# Patient Record
Sex: MALE | HISPANIC OR LATINO | Employment: FULL TIME | URBAN - METROPOLITAN AREA
[De-identification: names, ages, dates, MRNs, and addresses within clinical notes are randomized per-mention and may not be internally consistent; named-entity substitution may affect disease eponyms.]

---

## 2024-09-26 ENCOUNTER — HOSPITAL ENCOUNTER (EMERGENCY)
Facility: CLINIC | Age: 44
Discharge: HOME | End: 2024-09-26
Attending: EMERGENCY MEDICINE | Admitting: EMERGENCY MEDICINE

## 2024-09-26 VITALS
HEART RATE: 86 BPM | DIASTOLIC BLOOD PRESSURE: 102 MMHG | OXYGEN SATURATION: 97 % | SYSTOLIC BLOOD PRESSURE: 164 MMHG | RESPIRATION RATE: 18 BRPM | TEMPERATURE: 97.6 F

## 2024-09-26 DIAGNOSIS — H66.92 ACUTE LEFT OTITIS MEDIA: ICD-10-CM

## 2024-09-26 PROCEDURE — 99283 EMERGENCY DEPT VISIT LOW MDM: CPT

## 2024-09-26 ASSESSMENT — COLUMBIA-SUICIDE SEVERITY RATING SCALE - C-SSRS
6. HAVE YOU EVER DONE ANYTHING, STARTED TO DO ANYTHING, OR PREPARED TO DO ANYTHING TO END YOUR LIFE?: NO
1. IN THE PAST MONTH, HAVE YOU WISHED YOU WERE DEAD OR WISHED YOU COULD GO TO SLEEP AND NOT WAKE UP?: NO
2. HAVE YOU ACTUALLY HAD ANY THOUGHTS OF KILLING YOURSELF IN THE PAST MONTH?: NO

## 2024-09-26 ASSESSMENT — ACTIVITIES OF DAILY LIVING (ADL): ADLS_ACUITY_SCORE: 33

## 2024-09-26 NOTE — ED TRIAGE NOTES
Right ear pain for three days. Denies cough or fevers. Took ibuprofen at home that helped somewhat.      Triage Assessment (Adult)       Row Name 09/26/24 0054          Triage Assessment    Airway WDL WDL        Respiratory WDL    Respiratory WDL WDL        Skin Circulation/Temperature WDL    Skin Circulation/Temperature WDL WDL        Cardiac WDL    Cardiac WDL WDL

## 2024-09-26 NOTE — ED PROVIDER NOTES
Emergency Department Note      History of Present Illness     Chief Complaint  Otalgia    HPI  File Jone is a 44 year old male with history of hypertension who presents to the ED for evaluation of otalgia. He reports right ear pain for the past four days. Denies fever, runny nose, or sore throat. No recent use of antibiotics.     Independent Historian  None    Review of External Notes  None  Past Medical History   Medical History and Problem List  Hypertension    Medications  Lisinopril  Physical Exam   Patient Vitals for the past 24 hrs:   BP Temp Pulse Resp SpO2   09/26/24 0054 (!) 164/102 97.6  F (36.4  C) 86 18 97 %     Physical Exam  Gen: Resting comfortably   Eyes: Clear conjunctiva, no discharge  Ears: External ears normal without swelling or drainage. Left TM clear. Right TM bulging and arrhythmias   Mouth: Mucous membranes moist  CV: regular rate  Resp: speaking in full sentences without any resp distress  Skin: warm dry well perfused  Neuro: Alert, no gross motor or sensory deficits,   Psych: pleasant, affect appropriate  Diagnostics     ED Course    Medications Administered  Medications - No data to display    Procedures  Procedures     Discussion of Management  None    Optional/Additional Documentation  None    ED Course  ED Course as of 09/26/24 0515   Thu Sep 26, 2024   0139 I obtained history and examined the patient as noted above.    0142 I prepared the patient to be discharged home.      Medical Decision Making / Diagnosis   CMS Diagnoses: None    MIPS     None    MDM  Patient presents with ear pain, exam is consistent with otitis media.  Will place on antibiotics, discussed over-the-counter pain meds as needed.    Disposition  The patient was discharged.     ICD-10 Codes:    ICD-10-CM    1. Acute left otitis media  H66.92          Discharge Medications  Discharge Medication List as of 9/26/2024  1:43 AM        START taking these medications    Details   amoxicillin-clavulanate (AUGMENTIN)  875-125 MG tablet Take 1 tablet by mouth 2 times daily for 10 days., Disp-20 tablet, R-0, E-Prescribe           Scribe Disclosure:  I, Cecy Kennedy, am serving as a scribe at 1:43 AM on 9/26/2024 to document services personally performed by Myron Nunes MD based on my observations and the provider's statements to me.      Myron Nunes MD  09/26/24 0584

## 2025-01-18 ENCOUNTER — HOSPITAL ENCOUNTER (EMERGENCY)
Facility: CLINIC | Age: 45
Discharge: HOME | End: 2025-01-18
Attending: STUDENT IN AN ORGANIZED HEALTH CARE EDUCATION/TRAINING PROGRAM | Admitting: STUDENT IN AN ORGANIZED HEALTH CARE EDUCATION/TRAINING PROGRAM

## 2025-01-18 VITALS
RESPIRATION RATE: 16 BRPM | SYSTOLIC BLOOD PRESSURE: 136 MMHG | HEART RATE: 75 BPM | OXYGEN SATURATION: 99 % | TEMPERATURE: 97.3 F | DIASTOLIC BLOOD PRESSURE: 89 MMHG

## 2025-01-18 DIAGNOSIS — L40.9 PSORIASIS: ICD-10-CM

## 2025-01-18 PROCEDURE — 99283 EMERGENCY DEPT VISIT LOW MDM: CPT

## 2025-01-18 ASSESSMENT — COLUMBIA-SUICIDE SEVERITY RATING SCALE - C-SSRS
1. IN THE PAST MONTH, HAVE YOU WISHED YOU WERE DEAD OR WISHED YOU COULD GO TO SLEEP AND NOT WAKE UP?: NO
2. HAVE YOU ACTUALLY HAD ANY THOUGHTS OF KILLING YOURSELF IN THE PAST MONTH?: NO
6. HAVE YOU EVER DONE ANYTHING, STARTED TO DO ANYTHING, OR PREPARED TO DO ANYTHING TO END YOUR LIFE?: NO

## 2025-01-18 NOTE — ED TRIAGE NOTES
Pt states rash on arm, leg and head for over a year. Rash appears ry an scaly. States itchy.      Triage Assessment (Adult)       Row Name 01/18/25 1128          Triage Assessment    Airway WDL WDL        Respiratory WDL    Respiratory WDL WDL        Skin Circulation/Temperature WDL    Skin Circulation/Temperature WDL --  scaly dry skin patches        Cardiac WDL    Cardiac WDL WDL        Peripheral/Neurovascular WDL    Peripheral Neurovascular WDL WDL        Cognitive/Neuro/Behavioral WDL    Cognitive/Neuro/Behavioral WDL WDL

## 2025-01-18 NOTE — DISCHARGE INSTRUCTIONS
Return to the emergency department if symptoms are worsening, become concerning, or for any other concerns.     You will be contacted by dermatology for follow-up.

## 2025-01-18 NOTE — ED PROVIDER NOTES
Emergency Department Note      History of Present Illness     Chief Complaint   Rash      [facility]   File Jone is a 44 year old male with a history of hypertension presenting to the ED for a rash. Patient reports a scaly, itchy rash localized on his scalp, left forearm, and right knee that started around 18 months ago. He denies any changes to detergents or soap, fever, or problems drinking and eating. Applying Barmicil relieves the symptoms for a few days, with the last application being 20 days ago.     Independent Historian   None    Review of External Notes   None    Past Medical History     Medical History and Problem List   Hypertension    Medications   Prinivil    Surgical History   No past surgical history on file.    Physical Exam     Patient Vitals for the past 24 hrs:   BP Temp Temp src Pulse Resp SpO2   01/18/25 1129 136/89 97.3  F (36.3  C) Temporal 75 16 99 %     Physical Exam  GENERAL: Patient well-appearing  HEAD: Atraumatic.  NECK: No rigidity  CV: RRR, no murmurs, rubs or gallops  PULM: CTAB with good aeration; no retractions, rales, rhonchi, or wheezing  DERM: Well-demarcated, scaly plaques over extremities and predominantly scalp.  Not erythematous.  No pus.  No foul smell.  EXTREMITY: Moving all extremities without difficulty.        Diagnostics     Lab Results   Labs Ordered and Resulted from Time of ED Arrival to Time of ED Departure - No data to display    Imaging   No orders to display       EKG   None    Independent Interpretation   None    ED Course      Medications Administered   Medications - No data to display    Procedures   Procedures     Discussion of Management   None    ED Course   ED Course as of 01/18/25 1223   Sat Jan 18, 2025   1139 I obtained history and examined the patient as noted above.         Additional Documentation  None    Medical Decision Making / Diagnosis     CMS Diagnoses: None    MIPS       None    [facility]   File Jone is a 44 year old male presenting with rash  with symptoms most consistent with psoriasis.  Patient has a topical steroid, betamethasone that provides relief.  I recommend he keep his skin very well moisturized.  I discussed dietary modifications.  I placed a referral for dermatology. No evidence of cellulitis.  Do not think he requires antibiotics.  Patient was evaluated for acute medical emergencies. Based on my clinical assessment, I do not think any further acute management or work-up is required.  Patient stable for discharge. Given strict return precautions. All questions answered. Patient content with plan. Recommended PCP follow-up in 2-3 days.    Disposition   The patient was discharged.     Diagnosis     ICD-10-CM    1. Psoriasis  L40.9 Adult Dermatology  Referral           Discharge Medications   Discharge Medication List as of 1/18/2025 11:50 AM            Scribe Disclosure:  I, Thom Marroquin, am serving as a scribe at 11:50 AM on 1/18/2025 to document services personally performed by Luis Donahue MD based on my observations and the provider's statements to me.        Luis Donahue MD  01/18/25 2885

## 2025-01-20 ENCOUNTER — TELEPHONE (OUTPATIENT)
Dept: DERMATOLOGY | Facility: CLINIC | Age: 45
End: 2025-01-20

## 2025-01-20 NOTE — TELEPHONE ENCOUNTER
[facility] Call Center    Phone Message    May a detailed message be left on voicemail: yes     Reason for Call: Appointment Intake    Referring Provider Name: Luis Donahue MD @ [facility] Emergency Dept  Diagnosis and/or Symptoms: Psoriasis [L40.9]    Confirmed with patient that this is a hospital follow up and he prefers the [place] location. Routing to clinic per protocols    Action Taken: Message routed to:  Adult Clinics: Dermatology p [id number]    Travel Screening: Not Applicable     Date of Service:

## 2025-01-20 NOTE — TELEPHONE ENCOUNTER
RN reviewed referral, non urgent. Can schedule in next available.    consistent with psoriasis.  Patient has a topical steroid, betamethasone that provides relief.  I recommend he keep his skin very well moisturized.  I discussed dietary modifications.  I placed a referral for dermatology. No evidence of cellulitis.  Do not think he requires antibiotics.  Patient was evaluated for acute medical emergencies. Based on my clinical assessment, I do not think any further acute management or work-up is required.  Patient stable for discharge.    Tracy Pan RN on 1/20/2025 at 12:36 PM

## 2025-01-20 NOTE — TELEPHONE ENCOUNTER
01/20 Called patient (1st attempt) unable to leave voicemail to provided [phone] for patient to call back and schedule next available dermatology appointment.     [facility]     Surgical Specialties   [facility] [place]

## 2025-01-29 NOTE — TELEPHONE ENCOUNTER
FUTURE VISIT INFORMATION      FUTURE VISIT INFORMATION:  Date: 2.18.25  Time: 8:00  Location[place] [facility] [place]  REFERRAL INFORMATION:  Referring provider:  Godfrey  Referring providers clinic:  [facility]  Reason for visit/diagnosis  Per Pt File, L40.9 (ICD-10-CM) - Psoriasis, referred by ANI ACEVEDO      RECORDS REQUESTED FROM:       Clinic name Comments Records Status Imaging Status   [facility] 1.18.25  God[facility]

## 2025-02-18 ENCOUNTER — PRE VISIT (OUTPATIENT)
Dept: DERMATOLOGY | Facility: CLINIC | Age: 45
End: 2025-02-18

## 2025-04-01 ENCOUNTER — TELEPHONE (OUTPATIENT)
Dept: DERMATOLOGY | Facility: CLINIC | Age: 45
End: 2025-04-01

## 2025-04-01 ENCOUNTER — OFFICE VISIT (OUTPATIENT)
Dept: DERMATOLOGY | Facility: CLINIC | Age: 45
End: 2025-04-01
Payer: MEDICAID

## 2025-04-01 ENCOUNTER — LAB (OUTPATIENT)
Dept: LAB | Facility: CLINIC | Age: 45
End: 2025-04-01
Payer: MEDICAID

## 2025-04-01 DIAGNOSIS — L40.9 PSORIASIS: ICD-10-CM

## 2025-04-01 DIAGNOSIS — Z51.81 THERAPEUTIC DRUG MONITORING: Primary | ICD-10-CM

## 2025-04-01 DIAGNOSIS — Z51.81 THERAPEUTIC DRUG MONITORING: ICD-10-CM

## 2025-04-01 DIAGNOSIS — Z11.59 ENCOUNTER FOR SCREENING FOR OTHER VIRAL DISEASES: ICD-10-CM

## 2025-04-01 LAB
ALBUMIN SERPL BCG-MCNC: 4.6 G/DL (ref 3.5–5.2)
ALP SERPL-CCNC: 84 U/L (ref 40–150)
ALT SERPL W P-5'-P-CCNC: 52 U/L (ref 0–70)
ANION GAP SERPL CALCULATED.3IONS-SCNC: 11 MMOL/L (ref 7–15)
AST SERPL W P-5'-P-CCNC: 31 U/L (ref 0–45)
BASOPHILS # BLD AUTO: 0.1 10E3/UL (ref 0–0.2)
BASOPHILS NFR BLD AUTO: 1 %
BILIRUB SERPL-MCNC: 1.1 MG/DL
BUN SERPL-MCNC: 12.4 MG/DL (ref 6–20)
CALCIUM SERPL-MCNC: 9.5 MG/DL (ref 8.8–10.4)
CHLORIDE SERPL-SCNC: 104 MMOL/L (ref 98–107)
CHOLEST SERPL-MCNC: 170 MG/DL
CREAT SERPL-MCNC: 0.8 MG/DL (ref 0.67–1.17)
EGFRCR SERPLBLD CKD-EPI 2021: >90 ML/MIN/1.73M2
EOSINOPHIL # BLD AUTO: 0.3 10E3/UL (ref 0–0.7)
EOSINOPHIL NFR BLD AUTO: 4 %
ERYTHROCYTE [DISTWIDTH] IN BLOOD BY AUTOMATED COUNT: 12.5 % (ref 10–15)
FASTING STATUS PATIENT QL REPORTED: YES
FASTING STATUS PATIENT QL REPORTED: YES
GLUCOSE SERPL-MCNC: 110 MG/DL (ref 70–99)
HBV CORE AB SERPL QL IA: NONREACTIVE
HBV SURFACE AB SERPL IA-ACNC: <3.5 M[IU]/ML
HBV SURFACE AB SERPL IA-ACNC: NONREACTIVE M[IU]/ML
HBV SURFACE AG SERPL QL IA: NONREACTIVE
HCO3 SERPL-SCNC: 24 MMOL/L (ref 22–29)
HCT VFR BLD AUTO: 48.8 % (ref 40–53)
HCV AB SERPL QL IA: NONREACTIVE
HDLC SERPL-MCNC: 31 MG/DL
HGB BLD-MCNC: 16.6 G/DL (ref 13.3–17.7)
IMM GRANULOCYTES # BLD: 0 10E3/UL
IMM GRANULOCYTES NFR BLD: 1 %
LDLC SERPL CALC-MCNC: 79 MG/DL
LYMPHOCYTES # BLD AUTO: 2.4 10E3/UL (ref 0.8–5.3)
LYMPHOCYTES NFR BLD AUTO: 37 %
MCH RBC QN AUTO: 28.5 PG (ref 26.5–33)
MCHC RBC AUTO-ENTMCNC: 34 G/DL (ref 31.5–36.5)
MCV RBC AUTO: 84 FL (ref 78–100)
MONOCYTES # BLD AUTO: 0.5 10E3/UL (ref 0–1.3)
MONOCYTES NFR BLD AUTO: 7 %
NEUTROPHILS # BLD AUTO: 3.2 10E3/UL (ref 1.6–8.3)
NEUTROPHILS NFR BLD AUTO: 50 %
NONHDLC SERPL-MCNC: 139 MG/DL
NRBC # BLD AUTO: 0 10E3/UL
NRBC BLD AUTO-RTO: 0 /100
PLATELET # BLD AUTO: 203 10E3/UL (ref 150–450)
POTASSIUM SERPL-SCNC: 4.1 MMOL/L (ref 3.4–5.3)
PROT SERPL-MCNC: 7.8 G/DL (ref 6.4–8.3)
RBC # BLD AUTO: 5.83 10E6/UL (ref 4.4–5.9)
SODIUM SERPL-SCNC: 139 MMOL/L (ref 135–145)
TRIGL SERPL-MCNC: 298 MG/DL
WBC # BLD AUTO: 6.5 10E3/UL (ref 4–11)

## 2025-04-01 PROCEDURE — 1126F AMNT PAIN NOTED NONE PRSNT: CPT | Performed by: STUDENT IN AN ORGANIZED HEALTH CARE EDUCATION/TRAINING PROGRAM

## 2025-04-01 PROCEDURE — 99000 SPECIMEN HANDLING OFFICE-LAB: CPT | Performed by: PATHOLOGY

## 2025-04-01 PROCEDURE — 80053 COMPREHEN METABOLIC PANEL: CPT | Performed by: PATHOLOGY

## 2025-04-01 PROCEDURE — 86481 TB AG RESPONSE T-CELL SUSP: CPT | Performed by: STUDENT IN AN ORGANIZED HEALTH CARE EDUCATION/TRAINING PROGRAM

## 2025-04-01 PROCEDURE — 86706 HEP B SURFACE ANTIBODY: CPT | Performed by: STUDENT IN AN ORGANIZED HEALTH CARE EDUCATION/TRAINING PROGRAM

## 2025-04-01 PROCEDURE — 86803 HEPATITIS C AB TEST: CPT | Performed by: STUDENT IN AN ORGANIZED HEALTH CARE EDUCATION/TRAINING PROGRAM

## 2025-04-01 PROCEDURE — 80061 LIPID PANEL: CPT | Performed by: PATHOLOGY

## 2025-04-01 PROCEDURE — 85025 COMPLETE CBC W/AUTO DIFF WBC: CPT | Performed by: PATHOLOGY

## 2025-04-01 PROCEDURE — 36415 COLL VENOUS BLD VENIPUNCTURE: CPT | Performed by: PATHOLOGY

## 2025-04-01 PROCEDURE — 99204 OFFICE O/P NEW MOD 45 MIN: CPT | Performed by: STUDENT IN AN ORGANIZED HEALTH CARE EDUCATION/TRAINING PROGRAM

## 2025-04-01 PROCEDURE — 87340 HEPATITIS B SURFACE AG IA: CPT | Performed by: STUDENT IN AN ORGANIZED HEALTH CARE EDUCATION/TRAINING PROGRAM

## 2025-04-01 PROCEDURE — G2211 COMPLEX E/M VISIT ADD ON: HCPCS | Performed by: STUDENT IN AN ORGANIZED HEALTH CARE EDUCATION/TRAINING PROGRAM

## 2025-04-01 PROCEDURE — 86704 HEP B CORE ANTIBODY TOTAL: CPT | Performed by: STUDENT IN AN ORGANIZED HEALTH CARE EDUCATION/TRAINING PROGRAM

## 2025-04-01 ASSESSMENT — PAIN SCALES - GENERAL: PAINLEVEL_OUTOF10: NO PAIN (0)

## 2025-04-01 NOTE — PROGRESS NOTES
[facility] Dermatology Note  Encounter Date: Apr 1, 2025  Office Visit     Dermatology Problem List:  1. Psoriasis, newly diagnosed    ____________________________________________    Assessment & Plan:    # Psoriasis.   Patient has been using topical betamethosone from home with some relief before going into emergency room 1/18/2025 for a diagnosis. Since then he has had no control of his scalp, forearm, neck, ankle or gluteal cleft psoriasis.   He uses an over the counter topical cream called Barnicil from [place] that sometimes helps in the scalp region only.    - Moisturize: Recommend good OTC moisturizer to full body, such as Cera-Ve, Genna-cream, or Cetaphil. Advised best to apply after bathing to lock in moisture.  - Baseline labs today taken to start Sotyktu (Deucravacitinib) 6mg in hopes of controlling currently uncontrolled psoriasis and patient intolerance of injectables   -CBC   -CMP   - Lipids   -Hepatitis B, C Screening   -Quantiferon Gold    The longitudinal plan of care for the diagnosis(es)/condition(s) as documented were addressed during this visit. Due to the added complexity in care, I will continue to support Lissett in the subsequent management and with ongoing continuity of care for psoriasis.    Procedures Performed:   none    Follow-up: 4 month(s) in-person, or earlier for new or changing lesions    Staff and Medical Student:   Royer Proctor MS4  Dermatology Team  __________________________________    CC: No chief complaint on file.    HPI:  Mr. Lissett Becerril is a(n) 44 year old male who presents today as a new patient for new diagnosis psoriasis. He has had rashes in scalp (diffuse), L posterior forearm, R medial ankle, and gluteal cleft that he self treated with an over the counter cream from [place] for 1 year, with some hospital visits during this time at which he was given betamethasone topical, until visiting the ER 1/18/2025 where he was diagnosed with  psoriasis.    Since then he has found no control of itchy, thick plaques especially on his scalp and forearm.    Patient is otherwise feeling well, without additional skin concerns.  No joint pain of the knees or sacroiliac joint, no psoriatic arthritis. No dactylitis per patient interview.    Labs:  none reviewed.    Physical Exam:  Vitals: There were no vitals taken for this visit.  SKIN: Focused examination of affected areas (scalp, neck, arms, ankle) and nails was performed.    -Diffuse plaque with erythematous base, well defined borders, and scaling on scalp with patches of silvery scale.   -3x5cm plaque with silvery scale on L posterior forearm   -1cm circular plaque on R medial ankle with thick silvery scale    - No other lesions of concern on areas examined.     Medications:  Current Outpatient Medications   Medication Sig Dispense Refill    lisinopril (PRINIVIL,ZESTRIL) 20 MG tablet Take 1 tablet by mouth daily. 90 tablet 3     No current facility-administered medications for this visit.      Past Medical History:   There is no problem list on file for this patient.    Past Medical History:   Diagnosis Date    Hypertension         CC Luis Donahue MD  [facility]  [address], [address]  [place] [address] on close of this encounter.

## 2025-04-01 NOTE — LETTER
4/1/2025       RE: Lissett Becerril  [address] [place] [address]     Dear Colleague,    Thank you for referring your patient, Lissett Becerril, to the [facility]MATOLOG[facility] [place] at [facility]. Please see a copy of my visit note below.    [facility] Dermatology Note  Encounter Date: Apr 1, 2025  Office Visit     Dermatology Problem List:  1. Psoriasis, newly diagnosed    ____________________________________________    Assessment & Plan:    # Psoriasis.   Patient has been using topical betamethosone from home with some relief before going into emergency room 1/18/2025 for a diagnosis. Since then he has had no control of his scalp, forearm, neck, ankle or gluteal cleft psoriasis.   He uses an over the counter topical cream called Barnicil from [place] that sometimes helps in the scalp region only.    - Moisturize: Recommend good OTC moisturizer to full body, such as Cera-Ve, Genna-cream, or Cetaphil. Advised best to apply after bathing to lock in moisture.  - Baseline labs today taken to start Sotyktu (Deucravacitinib) 6mg in hopes of controlling currently uncontrolled psoriasis and patient intolerance of injectables   -CBC   -CMP   - Lipids   -Hepatitis B, C Screening   -Quantiferon Gold    The longitudinal plan of care for the diagnosis(es)/condition(s) as documented were addressed during this visit. Due to the added complexity in care, I will continue to support Lissett in the subsequent management and with ongoing continuity of care for psoriasis.    Procedures Performed:   none    Follow-up: 4 month(s) in-person, or earlier for new or changing lesions    Staff and Medical Student:   Royer Proctor MS4  Dermatology Team  __________________________________    CC: No chief complaint on file.    HPI:  Mr. Lissett Becerril is a(n) 44 year old male who presents today as a new patient for new diagnosis psoriasis. He has had rashes  in scalp (diffuse), L posterior forearm, R medial ankle, and gluteal cleft that he self treated with an over the counter cream from [place] for 1 year, with some hospital visits during this time at which he was given betamethasone topical, until visiting the ER 1/18/2025 where he was diagnosed with psoriasis.    Since then he has found no control of itchy, thick plaques especially on his scalp and forearm.    Patient is otherwise feeling well, without additional skin concerns.  No joint pain of the knees or sacroiliac joint, no psoriatic arthritis. No dactylitis per patient interview.    Labs:  none reviewed.    Physical Exam:  Vitals: There were no vitals taken for this visit.  SKIN: Focused examination of affected areas (scalp, neck, arms, ankle) and nails was performed.    -Diffuse plaque with erythematous base, well defined borders, and scaling on scalp with patches of silvery scale.   -3x5cm plaque with silvery scale on L posterior forearm   -1cm circular plaque on R medial ankle with thick silvery scale    - No other lesions of concern on areas examined.     Medications:  Current Outpatient Medications   Medication Sig Dispense Refill     lisinopril (PRINIVIL,ZESTRIL) 20 MG tablet Take 1 tablet by mouth daily. 90 tablet 3     No current facility-administered medications for this visit.      Past Medical History:   There is no problem list on file for this patient.    Past Medical History:   Diagnosis Date     Hypertension         CC Luis Donahue MD  [facility]  [address], [address]  [place] [address] on close of this encounter.      Attestation with edits by Mike Waters MD at 4/1/2025 10:16 AM:  I have personally examined this patient and agree with the medical student's documentation and plan of care. I have reviewed and amended the medical student's note as necessary.The documentation accurately reflects my clinical observations, diagnoses, treatment and follow-up plans.      Mike Waters MD  Dermatology Staff      Again, thank you for allowing me to participate in the care of your patient.      Sincerely,    Mike Waters MD

## 2025-04-01 NOTE — TELEPHONE ENCOUNTER
PA Initiation    Medication: SOTYKTU 6 MG PO TABS  Insurance Company: [employer] for [place] Medicaid Phone [phone] Fax [phone]  Pharmacy Filling the Rx:    Filling Pharmacy Phone:    Filling Pharmacy Fax:    Start Date: 4/1/2025    Key:[id number]

## 2025-04-02 ENCOUNTER — APPOINTMENT (OUTPATIENT)
Dept: INTERPRETER SERVICES | Facility: CLINIC | Age: 45
End: 2025-04-02
Payer: MEDICAID

## 2025-04-02 LAB
GAMMA INTERFERON BACKGROUND BLD IA-ACNC: 0.04 IU/ML
M TB IFN-G BLD-IMP: NEGATIVE
M TB IFN-G CD4+ BCKGRND COR BLD-ACNC: 9.96 IU/ML
MITOGEN IGNF BCKGRD COR BLD-ACNC: -0.02 IU/ML
MITOGEN IGNF BCKGRD COR BLD-ACNC: 0 IU/ML
QUANTIFERON MITOGEN: 10 IU/ML
QUANTIFERON NIL TUBE: 0.04 IU/ML
QUANTIFERON TB1 TUBE: 0.04 IU/ML
QUANTIFERON TB2 TUBE: 0.02

## 2025-04-02 NOTE — TELEPHONE ENCOUNTER
Sotyktu denied, patient needs to try the preferred formularies. The preferred medications are Enbrel, Humira, Otezla, and Xeljanz. Please advise on how you would like to proceed. If appealed please provide medical rational.

## 2025-04-02 NOTE — TELEPHONE ENCOUNTER
PRIOR AUTHORIZATION DENIED    Medication: SOTYKTU 6 MG PO TABS  Insurance Company: [employer] for [place] Medicaid Phone [phone] Fax [phone]  Denial Date: 4/2/2025  Denial Reason(s):   Appeal Information:

## 2025-04-07 ENCOUNTER — TELEPHONE (OUTPATIENT)
Dept: DERMATOLOGY | Facility: CLINIC | Age: 45
End: 2025-04-07

## 2025-04-07 NOTE — TELEPHONE ENCOUNTER
MTM appointment no showed, we made one more attempt to reschedule.     Routing back to referring provider and MTM Pharmacist Team        Libby Woodson  MTM

## 2025-04-08 ENCOUNTER — VIRTUAL VISIT (OUTPATIENT)
Dept: PHARMACY | Facility: CLINIC | Age: 45
End: 2025-04-08
Attending: STUDENT IN AN ORGANIZED HEALTH CARE EDUCATION/TRAINING PROGRAM
Payer: MEDICAID

## 2025-04-08 DIAGNOSIS — I10 HTN (HYPERTENSION): ICD-10-CM

## 2025-04-08 DIAGNOSIS — L40.9 PSORIASIS: Primary | ICD-10-CM

## 2025-04-08 NOTE — PROGRESS NOTES
Medication Therapy Management (MTM) Encounter    ASSESSMENT:                            Medication Adherence/Access: No issues identified.    Psoriasis: Since coverage was denied for Sotyktu and liaison noted insurance likely would prefer Humira, Enbrel, Otezla or Xeljanz, and since provider (via [employer] message to patient) was considering Otezla vs TNF-inhibitor, reviewed pros vs cons of Otezla vs TNF-inhibitors. Patient had preference for Otezla trial first therefore provided education on Otezla (apremilast) today including dosing, general administration, side effects (both common/serious), precautions, monitoring and time to efficacy. Encouraged patient to contact the Dermatology clinic in the event they have questions. Would benefit from starting Otezla once it arrives.    Hypertension: Stable, controlled. Defer to primary care provider for continued management of condition.      PLAN:                            MTM send orders forward for Otezla and will assist with obtaining coverage of Otezla.  Consider receiving the following vaccination(s): COVID-19 booster, annual flu shot, and tetanus booster (Tdap or similar).    Follow-up: Jt 7/31/25; JOHN 7/23/25     SUBJECTIVE/OBJECTIVE:                          Lissett Becerril is a 44 year old male called for an initial visit. He was referred to me from Dr. Mike Waters MD.     Reason for visit: Sotyktu vs Otezla review/start. [id number]    Allergies/ADRs: Reviewed in chart and with patient.  Past Medical History: Reviewed in chart.  Tobacco: He reports that he has never smoked. He does not have any smokeless tobacco history on file.  Alcohol: None.    Medication Adherence/Access: no issues reported.  Otezla coverage - Needs PA initiated.    Psoriasis:   - Apremilast (Otezla) 30 mg tablet: Take 1 tablet by mouth twice daily after starter pack.      04/08/25: Sotyktu was not covered by insurance therefore Dr. Waters had sent patient Zeolifehart referring to either Otezla or  every other week biologic (such as Humira or Enbrel) as next options. Reviewed pros vs cons of each and patient opted to try Otezla first and change to injection after 3-6 months if no effect from Otezla and if tolerated; reviewed Otezla education today. Patient notes he is ready for additional treatment due to already trying dietary changes and exercise changes to change psoriasis control without effect.    Specialist: Dr. Mike Waters MD, Dermatology. Last visit on 4/1/25. The following was recommended:   # Psoriasis.   Patient has been using topical betamethosone from home with some relief before going into emergency room 1/18/2025 for a diagnosis. Since then he has had no control of his scalp, forearm, neck, ankle or gluteal cleft psoriasis.   He uses an over the counter topical cream called Barnicil from [place] that sometimes helps in the scalp region only.  - Moisturize: Recommend good OTC moisturizer to full body, such as Cera-Ve, Genna-cream, or Cetaphil. Advised best to apply after bathing to lock in moisture.  - Baseline labs today taken to start Sotyktu (Deucravacitinib) 6mg in hopes of controlling currently uncontrolled psoriasis and patient intolerance of injectables  Follow-up: 4 month(s) in-person, or earlier for new or changing lesions    Previous treatment:   - Sotyktu not covered; never started    Immunization History   Covid-19 vaccine  Due to receive   Influenza (annual) Due to receive, avoid live FluMist   Tetanus/Tdap Due to receive   All patients on biologics should avoid live vaccines (varicella/VZV, intranasal influenza, MMR, or yellow fever vaccine (if traveling))      Hypertension:  - Lisinopril 20 mg once daily.    Patient reports no current medication side effects. Patient rarely self-monitors blood pressures.    Today's Vitals: There were no vitals taken for this visit.  ----------------  I spent 15 minutes with this patient today. All changes were made via collaborative practice  agreement with Dr. Mike Waters MD.     A summary of these recommendations was mailed to the patient and was sent via [employer].    Farida Huntely, Pharm.D.  Medication Therapy Management Pharmacist   [facility] Dermatology    Telemedicine Visit Details  The patient's medications can be safely assessed via a telemedicine encounter.  Type of service:  Telephone visit  Originating Location (pt. Location): Home    Distant Location (provider location):  Off-site  Start Time: 2:00 PM  End Time: 2:15 PM     Medication Therapy Recommendations  No medication therapy recommendations to display

## 2025-04-08 NOTE — PATIENT INSTRUCTIONS
"Recommendations from today's MTM visit:                                                    MTM (medication therapy management) is a service provided by a clinical pharmacist designed to help you get the most of out of your medicines.      MTM to assist with coverage of Otezla.  Consider receiving the following vaccination(s): COVID-19 booster, annual flu shot, and tetanus booster (Tdap or similar).    Follow-up: Waters 7/31/25; MTM 7/23/25     It was great speaking with you today.  I value your experience and would be very thankful for your time in providing feedback in our clinic survey. In the next few days, you may receive an email or text message from [employer] with a link to a survey related to your  clinical pharmacist.\"     To schedule another MTM appointment, please call the clinic directly or you may call the MTM scheduling line at [phone].    My Clinical Pharmacist's contact information:                                                      Please feel free to contact me with any questions or concerns you have.      Farida Huntley, Pharm.D.  Medication Therapy Management Pharmacist   [facility] Dermatology  MTM Scheduling Line: [phone]    "

## 2025-04-08 NOTE — Clinical Note
Hello! Based on your [employer] messages with the patient it looked like you were considering Otezla vs TNF-inhibitors so we reviewed biggest pros vs cons between each and he opted for Otezla first. I sent the order forward for insurance consideration and we reviewed education on it. Ill follow up with him via [employer] once coverage comes through and again in 3 months or earlier if needed. Thanks!

## 2025-04-08 NOTE — TELEPHONE ENCOUNTER
Discussed alternatives with File (Otezla vs. Biologic) and patient opted to first try Otezla at this time. Sending orders and will route MTM encounter to Dr. Mike Waters MD as an FYI.     Farida Huntley [facility]

## 2025-04-20 ENCOUNTER — HEALTH MAINTENANCE LETTER (OUTPATIENT)
Age: 45
End: 2025-04-20

## 2025-05-20 ENCOUNTER — PHARMACY VISIT (OUTPATIENT)
Dept: ADMINISTRATIVE | Facility: CLINIC | Age: 45
End: 2025-05-20
Payer: MEDICAID

## 2025-05-20 NOTE — PROGRESS NOTES
"   Psoriasis Clinical Follow Up Assessment   Completed on 2025/05/20 12:59:33, by Unruly Parsons      Activity Medications    OTEZLA 10 & 20 & 30MG TBPK      Summary Notes   Spoke with File (pronounced Fee-Lay) today for assessment.     Current Regimen: Otezla (finished starter pack now)     Adherence: Started around 4/15/25, discussed adherence, he may have missed a dose or two in the last month. Discussed the refill process and texting.     Tolerability: Denies SE's. No diarrhea, maybe a couple days of slight nausea, but it subsided quickly.     Med/Allergy Changes: Added clobetasol topical to the list     *PsO*: From [facility] initial : \"Psoriasis seems mild--mostly just annoying (most prominent body part affected is the scalp). Patient states he uses a cream from [place] that helps his scalp but not other body parts (seems like a combination of terbinafine and a steroid and something else).\"   Today, he says things are much improved. He attributes this mainly to the steroid. But I tried to make sure he understands that Otezla takes a bit more time to show it's full benefit and there are downsides to overusing topical steroids for too long. So our hope is that the Otezla will work well and eventually take over, so you will not have much need for the steroids.   He is happy with the results so far. No other questions or concerns.     Follow-Up: One month, added [facility] Placeholder for 7/23/25    Unruly Parsons, PharmD  Therapy Management Pharmacist  [facility]       "

## 2025-08-21 ENCOUNTER — HOSPITAL ENCOUNTER (EMERGENCY)
Facility: CLINIC | Age: 45
End: 2025-08-21
Payer: MEDICAID

## 2025-08-21 VITALS
SYSTOLIC BLOOD PRESSURE: 132 MMHG | HEART RATE: 80 BPM | DIASTOLIC BLOOD PRESSURE: 86 MMHG | OXYGEN SATURATION: 99 % | RESPIRATION RATE: 18 BRPM | TEMPERATURE: 97.8 F

## 2025-08-21 DIAGNOSIS — K59.00 CONSTIPATION, UNSPECIFIED CONSTIPATION TYPE: Primary | ICD-10-CM

## 2025-08-21 DIAGNOSIS — R10.30 LOWER ABDOMINAL PAIN: ICD-10-CM

## 2025-08-21 LAB
ALBUMIN SERPL BCG-MCNC: 4.1 G/DL (ref 3.5–5.2)
ALP SERPL-CCNC: 85 U/L (ref 40–150)
ALT SERPL W P-5'-P-CCNC: 32 U/L (ref 0–70)
ANION GAP SERPL CALCULATED.3IONS-SCNC: 12 MMOL/L (ref 7–15)
AST SERPL W P-5'-P-CCNC: 18 U/L (ref 0–45)
BASOPHILS # BLD AUTO: 0.04 10E3/UL (ref 0–0.2)
BASOPHILS NFR BLD AUTO: 0.4 %
BILIRUB SERPL-MCNC: 1.1 MG/DL
BUN SERPL-MCNC: 13.4 MG/DL (ref 6–20)
CALCIUM SERPL-MCNC: 9 MG/DL (ref 8.8–10.4)
CHLORIDE SERPL-SCNC: 102 MMOL/L (ref 98–107)
CREAT SERPL-MCNC: 0.86 MG/DL (ref 0.67–1.17)
EGFRCR SERPLBLD CKD-EPI 2021: >90 ML/MIN/1.73M2
EOSINOPHIL # BLD AUTO: 0.18 10E3/UL (ref 0–0.7)
EOSINOPHIL NFR BLD AUTO: 1.7 %
ERYTHROCYTE [DISTWIDTH] IN BLOOD BY AUTOMATED COUNT: 12.4 % (ref 10–15)
GLUCOSE SERPL-MCNC: 111 MG/DL (ref 70–99)
HCO3 SERPL-SCNC: 23 MMOL/L (ref 22–29)
HCT VFR BLD AUTO: 43.7 % (ref 40–53)
HGB BLD-MCNC: 14.9 G/DL (ref 13.3–17.7)
IMM GRANULOCYTES # BLD: 0.05 10E3/UL
IMM GRANULOCYTES NFR BLD: 0.5 %
LIPASE SERPL-CCNC: 37 U/L (ref 13–60)
LYMPHOCYTES # BLD AUTO: 2.67 10E3/UL (ref 0.8–5.3)
LYMPHOCYTES NFR BLD AUTO: 25.2 %
MCH RBC QN AUTO: 28.3 PG (ref 26.5–33)
MCHC RBC AUTO-ENTMCNC: 34.1 G/DL (ref 31.5–36.5)
MCV RBC AUTO: 83.1 FL (ref 78–100)
MONOCYTES # BLD AUTO: 0.5 10E3/UL (ref 0–1.3)
MONOCYTES NFR BLD AUTO: 4.7 %
NEUTROPHILS # BLD AUTO: 7.14 10E3/UL (ref 1.6–8.3)
NEUTROPHILS NFR BLD AUTO: 67.5 %
NRBC # BLD AUTO: <0.03 10E3/UL
NRBC BLD AUTO-RTO: 0 /100
PLATELET # BLD AUTO: 212 10E3/UL (ref 150–450)
POTASSIUM SERPL-SCNC: 4.2 MMOL/L (ref 3.4–5.3)
PROT SERPL-MCNC: 7.4 G/DL (ref 6.4–8.3)
RBC # BLD AUTO: 5.26 10E6/UL (ref 4.4–5.9)
SODIUM SERPL-SCNC: 137 MMOL/L (ref 135–145)
WBC # BLD AUTO: 10.58 10E3/UL (ref 4–11)

## 2025-08-21 PROCEDURE — 81003 URINALYSIS AUTO W/O SCOPE: CPT | Performed by: EMERGENCY MEDICINE

## 2025-08-21 PROCEDURE — 99284 EMERGENCY DEPT VISIT MOD MDM: CPT | Performed by: EMERGENCY MEDICINE

## 2025-08-21 PROCEDURE — 85025 COMPLETE CBC W/AUTO DIFF WBC: CPT | Performed by: EMERGENCY MEDICINE

## 2025-08-21 PROCEDURE — 36415 COLL VENOUS BLD VENIPUNCTURE: CPT | Performed by: EMERGENCY MEDICINE

## 2025-08-21 PROCEDURE — 82310 ASSAY OF CALCIUM: CPT | Performed by: EMERGENCY MEDICINE

## 2025-08-21 PROCEDURE — 83690 ASSAY OF LIPASE: CPT | Performed by: EMERGENCY MEDICINE

## 2025-08-21 ASSESSMENT — COLUMBIA-SUICIDE SEVERITY RATING SCALE - C-SSRS
1. IN THE PAST MONTH, HAVE YOU WISHED YOU WERE DEAD OR WISHED YOU COULD GO TO SLEEP AND NOT WAKE UP?: NO
6. HAVE YOU EVER DONE ANYTHING, STARTED TO DO ANYTHING, OR PREPARED TO DO ANYTHING TO END YOUR LIFE?: NO
2. HAVE YOU ACTUALLY HAD ANY THOUGHTS OF KILLING YOURSELF IN THE PAST MONTH?: NO

## 2025-08-21 ASSESSMENT — ACTIVITIES OF DAILY LIVING (ADL): ADLS_ACUITY_SCORE: 41

## 2025-08-22 VITALS
TEMPERATURE: 97.2 F | DIASTOLIC BLOOD PRESSURE: 74 MMHG | RESPIRATION RATE: 16 BRPM | SYSTOLIC BLOOD PRESSURE: 138 MMHG | HEART RATE: 58 BPM | OXYGEN SATURATION: 99 %

## 2025-08-22 LAB
ALBUMIN UR-MCNC: NEGATIVE MG/DL
APPEARANCE UR: CLEAR
BILIRUB UR QL STRIP: NEGATIVE
COLOR UR AUTO: YELLOW
GLUCOSE UR STRIP-MCNC: NEGATIVE MG/DL
HGB UR QL STRIP: ABNORMAL
KETONES UR STRIP-MCNC: ABNORMAL MG/DL
LEUKOCYTE ESTERASE UR QL STRIP: NEGATIVE
MUCOUS THREADS #/AREA URNS LPF: PRESENT /LPF
NITRATE UR QL: NEGATIVE
PH UR STRIP: 5.5 [PH] (ref 5–7)
RBC URINE: 1 /HPF
SP GR UR STRIP: 1.03 (ref 1–1.03)
UROBILINOGEN UR STRIP-MCNC: NORMAL MG/DL
WBC URINE: <1 /HPF

## 2025-08-22 RX ORDER — SENNOSIDES 8.6 MG
1 TABLET ORAL DAILY
Qty: 30 TABLET | Refills: 0 | Status: SHIPPED | OUTPATIENT
Start: 2025-08-22

## 2025-08-22 RX ORDER — POLYETHYLENE GLYCOL 3350 17 G/17G
1 POWDER, FOR SOLUTION ORAL DAILY
Qty: 510 G | Refills: 0 | Status: SHIPPED | OUTPATIENT
Start: 2025-08-22 | End: 2025-09-21

## 2025-08-22 ASSESSMENT — ACTIVITIES OF DAILY LIVING (ADL): ADLS_ACUITY_SCORE: 41
